# Patient Record
Sex: MALE | Race: WHITE | NOT HISPANIC OR LATINO | Employment: OTHER | URBAN - METROPOLITAN AREA
[De-identification: names, ages, dates, MRNs, and addresses within clinical notes are randomized per-mention and may not be internally consistent; named-entity substitution may affect disease eponyms.]

---

## 2017-12-29 ENCOUNTER — OFFICE VISIT (OUTPATIENT)
Dept: INTERNAL MEDICINE | Age: 22
End: 2017-12-29

## 2017-12-29 VITALS
BODY MASS INDEX: 16.04 KG/M2 | SYSTOLIC BLOOD PRESSURE: 110 MMHG | TEMPERATURE: 98.7 F | HEIGHT: 75 IN | DIASTOLIC BLOOD PRESSURE: 60 MMHG | RESPIRATION RATE: 12 BRPM | WEIGHT: 129 LBS | OXYGEN SATURATION: 98 % | HEART RATE: 86 BPM

## 2017-12-29 DIAGNOSIS — M41.9 SCOLIOSIS OF LUMBAR SPINE, UNSPECIFIED SCOLIOSIS TYPE: ICD-10-CM

## 2017-12-29 DIAGNOSIS — J30.1 CHRONIC SEASONAL ALLERGIC RHINITIS DUE TO POLLEN: Primary | ICD-10-CM

## 2017-12-29 PROCEDURE — 99203 OFFICE O/P NEW LOW 30 MIN: CPT | Performed by: INTERNAL MEDICINE

## 2017-12-29 RX ORDER — DIPHENHYDRAMINE HCL 12.5MG/5ML
LIQUID (ML) ORAL
COMMUNITY
End: 2022-11-07

## 2017-12-29 RX ORDER — CLOBETASOL PROPIONATE 0.5 MG/G
OINTMENT TOPICAL
COMMUNITY
Start: 2017-12-11 | End: 2022-11-07

## 2017-12-29 NOTE — PROGRESS NOTES
Kenney Balderrama is a 22 y.o. male who presents with   Chief Complaint   Patient presents with   • Get established     22-year-old male presents to [facility] for adult medical care.  He has been seeing his pediatrician but needs an internal medicine doctor to continue his care into adulthood.  He has no complaints.  He does have a history of scoliosis of his lumbar area and seasonal allergies but other than that his history is negative and he is not on any regular medication by prescription.   .    History of Present Illness -history as above.    The following portions of the patient's history were reviewed and updated as appropriate: allergies, current medications, past medical history and problem list.    Review of Systems   Constitutional: Negative.    HENT: Negative.    Eyes: Negative.    Respiratory: Negative.    Cardiovascular: Negative.    Genitourinary: Negative.    Musculoskeletal: Negative.         Past history of lumbar scoliosis.  Currently asymptomatic for back pain.   Skin: Negative.    Neurological: Negative.    Psychiatric/Behavioral: Negative.        Objective   Physical Exam   Constitutional: He is oriented to person, place, and time. He appears well-developed and well-nourished. No distress.   HENT:   Head: Normocephalic and atraumatic.   Eyes: Conjunctivae and EOM are normal. Pupils are equal, round, and reactive to light.   Neck: Normal range of motion. Neck supple. No thyromegaly present.   Neck exam negative.  Carotid auscultation normal-no bruits heard.   Cardiovascular: Normal rate, regular rhythm, normal heart sounds and intact distal pulses.  Exam reveals no gallop and no friction rub.    No murmur heard.  Pulmonary/Chest: Effort normal and breath sounds normal. No respiratory distress. He has no wheezes. He has no rales. He exhibits no tenderness.   Neurological: He is alert and oriented to person, place, and time.   Psychiatric: He has a normal mood and affect. His behavior is normal.  "Judgment and thought content normal.   Nursing note and vitals reviewed.      Assessment/Plan   Kenney was seen today for get established.    Diagnoses and all orders for this visit:    Chronic seasonal allergic rhinitis due to pollen    Scoliosis of lumbar spine, unspecified scoliosis type      Plan: This is a healthy 22-year-old male who has no complaints on today's visit but presents to get established for future internal medical care.  He has transferred from his pediatrician's office.  He has no allergies to medication but does have a history of \"seasonal allergies\".  He is not on any oral prescription medication.  He has had no surgery and no medical problems in the past to amount to anything he says.    At this point he does not need any medical attention for any problems.  I suggested we see him in one year for a general follow-up visit.  He was advised to come in fasting then and we will get some labs and check a few things out like CMP, lipid panel and thyroid profile with her blood count.  Review of his medical records from the past showed a platelet count of 143,000 in August 2015 from [facility].         "

## 2018-01-11 ENCOUNTER — OFFICE VISIT (OUTPATIENT)
Dept: INTERNAL MEDICINE | Age: 23
End: 2018-01-11

## 2018-01-11 VITALS
HEIGHT: 75 IN | TEMPERATURE: 98.2 F | DIASTOLIC BLOOD PRESSURE: 76 MMHG | OXYGEN SATURATION: 98 % | HEART RATE: 99 BPM | SYSTOLIC BLOOD PRESSURE: 112 MMHG | WEIGHT: 129 LBS | BODY MASS INDEX: 16.04 KG/M2

## 2018-01-11 DIAGNOSIS — J06.9 VIRAL URI WITH COUGH: Primary | ICD-10-CM

## 2018-01-11 LAB
EXPIRATION DATE: NORMAL
FLUAV AG NPH QL: NEGATIVE
FLUBV AG NPH QL: NEGATIVE
INTERNAL CONTROL: NORMAL
Lab: NORMAL

## 2018-01-11 PROCEDURE — 87804 INFLUENZA ASSAY W/OPTIC: CPT | Performed by: NURSE PRACTITIONER

## 2018-01-11 PROCEDURE — 99213 OFFICE O/P EST LOW 20 MIN: CPT | Performed by: NURSE PRACTITIONER

## 2018-01-11 NOTE — PROGRESS NOTES
Subjective   Kenney Balderrama is a 22 y.o. male.     URI    This is a new problem. The current episode started yesterday. Maximum temperature: 99.8. Associated symptoms include coughing, headaches, rhinorrhea, a sore throat and vomiting (x 1 episode). Pertinent negatives include no chest pain, diarrhea or nausea. He has tried acetaminophen and decongestant for the symptoms. The treatment provided mild relief.    He has had flu vaccination this season. Has had sick contacts at school.     The following portions of the patient's history were reviewed and updated as appropriate: allergies, current medications, past family history, past medical history, past social history, past surgical history and problem list.    Review of Systems   Constitutional: Positive for chills, fatigue and fever.   HENT: Positive for rhinorrhea and sore throat.    Respiratory: Positive for cough.    Cardiovascular: Negative for chest pain.   Gastrointestinal: Positive for vomiting (x 1 episode). Negative for diarrhea and nausea.   Neurological: Positive for headaches.       Objective   Physical Exam   Constitutional: He is oriented to person, place, and time. Vital signs are normal. He appears well-developed and well-nourished. He is cooperative. He does not appear ill. No distress.   HENT:   Head: Normocephalic and atraumatic.   Right Ear: Hearing, tympanic membrane, external ear and ear canal normal. No drainage or swelling. Tympanic membrane is not injected and not erythematous. No middle ear effusion.   Left Ear: Hearing, tympanic membrane, external ear and ear canal normal. No drainage or swelling. Tympanic membrane is not injected and not erythematous.  No middle ear effusion.   Nose: Nose normal.   Mouth/Throat: Uvula is midline, oropharynx is clear and moist and mucous membranes are normal. No tonsillar exudate.   Cardiovascular: Normal rate, regular rhythm and normal heart sounds.    No murmur heard.  Pulmonary/Chest: Effort normal and  breath sounds normal. He has no decreased breath sounds. He has no wheezes. He has no rhonchi. He has no rales.   Lymphadenopathy:     He has no cervical adenopathy.        Right cervical: No superficial cervical, no deep cervical and no posterior cervical adenopathy present.       Left cervical: No superficial cervical, no deep cervical and no posterior cervical adenopathy present.   Neurological: He is alert and oriented to person, place, and time.   Skin: Skin is warm, dry and intact.   Psychiatric: He has a normal mood and affect. His speech is normal and behavior is normal. Judgment and thought content normal. Cognition and memory are normal.   Nursing note and vitals reviewed.      Assessment/Plan   Problems Addressed this Visit     None      Visit Diagnoses     Viral URI with cough    -  Primary    Relevant Orders    POCT Influenza A/B        1. Viral URI with cough  Discussed appropriate conservative and symptomatic treatment with patient, including use of NSAIDs or Tylenol for fever or pain, increase by mouth fluids, and rest. Discussed when to follow up for recheck, including worsening symptoms such as fever, purulent nasal drainage, worsening cough, productive cough, etc.     - POCT Influenza A/B

## 2018-01-11 NOTE — PATIENT INSTRUCTIONS
"Upper Respiratory Infection, Adult  Most upper respiratory infections (URIs) are a viral infection of the air passages leading to the lungs. A URI affects the nose, throat, and upper air passages. The most common type of URI is nasopharyngitis and is typically referred to as \"the common cold.\"  URIs run their course and usually go away on their own. Most of the time, a URI does not require medical attention, but sometimes a bacterial infection in the upper airways can follow a viral infection. This is called a secondary infection. Sinus and middle ear infections are common types of secondary upper respiratory infections.  Bacterial pneumonia can also complicate a URI. A URI can worsen asthma and chronic obstructive pulmonary disease (COPD). Sometimes, these complications can require emergency medical care and may be life threatening.   CAUSES  Almost all URIs are caused by viruses. A virus is a type of germ and can spread from one person to another.   RISKS FACTORS  You may be at risk for a URI if:   · You smoke.    · You have chronic heart or lung disease.  · You have a weakened defense (immune) system.    · You are very young or very old.    · You have nasal allergies or asthma.  · You work in crowded or poorly ventilated areas.  · You work in health care facilities or schools.  SIGNS AND SYMPTOMS   Symptoms typically develop 2-3 days after you come in contact with a cold virus. Most viral URIs last 7-10 days. However, viral URIs from the influenza virus (flu virus) can last 14-18 days and are typically more severe. Symptoms may include:   · Runny or stuffy (congested) nose.    · Sneezing.    · Cough.    · Sore throat.    · Headache.    · Fatigue.    · Fever.    · Loss of appetite.    · Pain in your forehead, behind your eyes, and over your cheekbones (sinus pain).  · Muscle aches.    DIAGNOSIS   Your health care provider may diagnose a URI by:  · Physical exam.  · Tests to check that your symptoms are not due to " another condition such as:  ¨ Strep throat.  ¨ Sinusitis.  ¨ Pneumonia.  ¨ Asthma.  TREATMENT   A URI goes away on its own with time. It cannot be cured with medicines, but medicines may be prescribed or recommended to relieve symptoms. Medicines may help:  · Reduce your fever.  · Reduce your cough.  · Relieve nasal congestion.  HOME CARE INSTRUCTIONS   · Take medicines only as directed by your health care provider.    · Gargle warm saltwater or take cough drops to comfort your throat as directed by your health care provider.  · Use a warm mist humidifier or inhale steam from a shower to increase air moisture. This may make it easier to breathe.  · Drink enough fluid to keep your urine clear or pale yellow.    · Eat soups and other clear broths and maintain good nutrition.    · Rest as needed.    · Return to work when your temperature has returned to normal or as your health care provider advises. You may need to stay home longer to avoid infecting others. You can also use a face mask and careful hand washing to prevent spread of the virus.  · Increase the usage of your inhaler if you have asthma.    · Do not use any tobacco products, including cigarettes, chewing tobacco, or electronic cigarettes. If you need help quitting, ask your health care provider.  PREVENTION   The best way to protect yourself from getting a cold is to practice good hygiene.   · Avoid oral or hand contact with people with cold symptoms.    · Wash your hands often if contact occurs.    There is no clear evidence that vitamin C, vitamin E, echinacea, or exercise reduces the chance of developing a cold. However, it is always recommended to get plenty of rest, exercise, and practice good nutrition.   SEEK MEDICAL CARE IF:   · You are getting worse rather than better.    · Your symptoms are not controlled by medicine.    · You have chills.  · You have worsening shortness of breath.  · You have brown or red mucus.  · You have yellow or brown nasal  discharge.  · You have pain in your face, especially when you bend forward.  · You have a fever.  · You have swollen neck glands.  · You have pain while swallowing.  · You have white areas in the back of your throat.  SEEK IMMEDIATE MEDICAL CARE IF:   · You have severe or persistent:    Headache.    Ear pain.    Sinus pain.    Chest pain.  · You have chronic lung disease and any of the following:    Wheezing.    Prolonged cough.    Coughing up blood.    A change in your usual mucus.  · You have a stiff neck.  · You have changes in your:    Vision.    Hearing.    Thinking.    Mood.  MAKE SURE YOU:   · Understand these instructions.  · Will watch your condition.  · Will get help right away if you are not doing well or get worse.     This information is not intended to replace advice given to you by your health care provider. Make sure you discuss any questions you have with your health care provider.     Document Released: 06/13/2002 Document Revised: 05/03/2016 Document Reviewed: 03/25/2015  [email or web address] Interactive Patient Education ©2017 [employer]

## 2018-12-28 ENCOUNTER — OFFICE VISIT (OUTPATIENT)
Dept: INTERNAL MEDICINE | Age: 23
End: 2018-12-28

## 2018-12-28 VITALS
SYSTOLIC BLOOD PRESSURE: 110 MMHG | HEIGHT: 75 IN | OXYGEN SATURATION: 97 % | WEIGHT: 134 LBS | TEMPERATURE: 97.2 F | HEART RATE: 88 BPM | RESPIRATION RATE: 13 BRPM | DIASTOLIC BLOOD PRESSURE: 60 MMHG | BODY MASS INDEX: 16.66 KG/M2

## 2018-12-28 DIAGNOSIS — Z00.00 ANNUAL PHYSICAL EXAM: Primary | ICD-10-CM

## 2018-12-28 LAB
ALBUMIN SERPL-MCNC: 4.7 G/DL (ref 3.5–5.2)
ALBUMIN/GLOB SERPL: 2 G/DL
ALP SERPL-CCNC: 90 U/L (ref 39–117)
ALT SERPL-CCNC: 16 U/L (ref 1–41)
APPEARANCE UR: CLEAR
AST SERPL-CCNC: 16 U/L (ref 1–40)
BASOPHILS # BLD AUTO: 0.06 10*3/MM3 (ref 0–0.2)
BASOPHILS NFR BLD AUTO: 0.9 % (ref 0–1.5)
BILIRUB SERPL-MCNC: 0.5 MG/DL (ref 0.1–1.2)
BILIRUB UR QL STRIP: NEGATIVE
BUN SERPL-MCNC: 10 MG/DL (ref 6–20)
BUN/CREAT SERPL: 11.9 (ref 7–25)
CALCIUM SERPL-MCNC: 9.7 MG/DL (ref 8.6–10.5)
CHLORIDE SERPL-SCNC: 105 MMOL/L (ref 98–107)
CHOLEST SERPL-MCNC: 114 MG/DL (ref 0–200)
CO2 SERPL-SCNC: 28.9 MMOL/L (ref 22–29)
COLOR UR: YELLOW
CREAT SERPL-MCNC: 0.84 MG/DL (ref 0.76–1.27)
EOSINOPHIL # BLD AUTO: 0.84 10*3/MM3 (ref 0–0.7)
EOSINOPHIL NFR BLD AUTO: 12 % (ref 0.3–6.2)
ERYTHROCYTE [DISTWIDTH] IN BLOOD BY AUTOMATED COUNT: 13 % (ref 11.5–14.5)
GLOBULIN SER CALC-MCNC: 2.4 GM/DL
GLUCOSE SERPL-MCNC: 92 MG/DL (ref 65–99)
GLUCOSE UR QL: NEGATIVE
HCT VFR BLD AUTO: 44.5 % (ref 40.4–52.2)
HDLC SERPL-MCNC: 65 MG/DL (ref 40–60)
HGB BLD-MCNC: 15.5 G/DL (ref 13.7–17.6)
HGB UR QL STRIP: NEGATIVE
IMM GRANULOCYTES # BLD: 0.01 10*3/MM3 (ref 0–0.03)
IMM GRANULOCYTES NFR BLD: 0.1 % (ref 0–0.5)
KETONES UR QL STRIP: NEGATIVE
LDLC SERPL CALC-MCNC: 41 MG/DL (ref 0–100)
LEUKOCYTE ESTERASE UR QL STRIP: NEGATIVE
LYMPHOCYTES # BLD AUTO: 2.29 10*3/MM3 (ref 0.9–4.8)
LYMPHOCYTES NFR BLD AUTO: 32.8 % (ref 19.6–45.3)
MCH RBC QN AUTO: 31.3 PG (ref 27–32.7)
MCHC RBC AUTO-ENTMCNC: 34.8 G/DL (ref 32.6–36.4)
MCV RBC AUTO: 89.9 FL (ref 79.8–96.2)
MONOCYTES # BLD AUTO: 0.41 10*3/MM3 (ref 0.2–1.2)
MONOCYTES NFR BLD AUTO: 5.9 % (ref 5–12)
NEUTROPHILS # BLD AUTO: 3.38 10*3/MM3 (ref 1.9–8.1)
NEUTROPHILS NFR BLD AUTO: 48.4 % (ref 42.7–76)
NITRITE UR QL STRIP: NEGATIVE
PH UR STRIP: 6 [PH] (ref 5–8)
PLATELET # BLD AUTO: 189 10*3/MM3 (ref 140–500)
POTASSIUM SERPL-SCNC: 4.3 MMOL/L (ref 3.5–5.2)
PROT SERPL-MCNC: 7.1 G/DL (ref 6–8.5)
PROT UR QL STRIP: NEGATIVE
RBC # BLD AUTO: 4.95 10*6/MM3 (ref 4.6–6)
SODIUM SERPL-SCNC: 145 MMOL/L (ref 136–145)
SP GR UR: 1.02 (ref 1–1.03)
T4 FREE SERPL-MCNC: 1.2 NG/DL (ref 0.93–1.7)
TRIGL SERPL-MCNC: 41 MG/DL (ref 0–150)
TSH SERPL DL<=0.005 MIU/L-ACNC: 1.55 MIU/ML (ref 0.27–4.2)
UROBILINOGEN UR STRIP-MCNC: NORMAL MG/DL
VLDLC SERPL CALC-MCNC: 8.2 MG/DL (ref 5–40)
WBC # BLD AUTO: 6.98 10*3/MM3 (ref 4.5–10.7)

## 2018-12-28 PROCEDURE — 99395 PREV VISIT EST AGE 18-39: CPT | Performed by: INTERNAL MEDICINE

## 2018-12-28 NOTE — PROGRESS NOTES
"  Kenney Balderrama is a 23 y.o. male who presents with   Chief Complaint   Patient presents with   • Annual Exam     No complaints or problems.  History of seasonal allergies every spring.  Patient requesting labs since he has not had any in several years.   .    23-year-old male presents for a \"checkup and lab update\" however after talking to him although it was not scheduled he really had in mind a physical exam with labs being done.  He says as far as he knows he is in good health but he has not had any lab tests done in many years.  He does have a history of seasonal allergies and affect him generally every spring.  He is not on any current medications.  He does not have any allergies to medications that he knows of and currently has no medical symptoms or problems he says.         The following portions of the patient's history were reviewed and updated as appropriate: allergies, current medications, past family history, past medical history, past social history, past surgical history and problem list.    Review of Systems   Constitutional: Negative.    HENT: Negative.    Eyes: Negative.    Respiratory: Negative.    Cardiovascular: Negative.    Genitourinary: Negative.    Musculoskeletal: Negative.    Skin: Negative.    Neurological: Negative.    Psychiatric/Behavioral: Negative.        Objective   Physical Exam   Constitutional: He is oriented to person, place, and time. He appears well-developed and well-nourished. No distress.   HENT:   Head: Normocephalic and atraumatic.   Right Ear: External ear normal.   Left Ear: External ear normal.   Nose: Nose normal.   Mouth/Throat: Oropharynx is clear and moist. No oropharyngeal exudate.   Eyes: Conjunctivae and EOM are normal. Pupils are equal, round, and reactive to light. Right eye exhibits no discharge. Left eye exhibits no discharge. No scleral icterus.   Neck: Normal range of motion. Neck supple. No JVD present. No tracheal deviation present. No thyromegaly " present.   Neck exam negative.  Carotid auscultation normal-no bruits heard.   Cardiovascular: Normal rate, regular rhythm, normal heart sounds and intact distal pulses. Exam reveals no gallop and no friction rub.   No murmur heard.  Pulmonary/Chest: Effort normal and breath sounds normal. No respiratory distress. He has no wheezes. He has no rales. He exhibits no tenderness.   Abdominal: Soft. Bowel sounds are normal. He exhibits no distension and no mass. There is no tenderness. No hernia.   Genitourinary:   Genitourinary Comments: Deferred.  Asymptomatic.   Musculoskeletal: Normal range of motion. He exhibits no edema, tenderness or deformity.   Lymphadenopathy:     He has no cervical adenopathy.   Neurological: He is alert and oriented to person, place, and time. He has normal reflexes. He displays normal reflexes. No cranial nerve deficit. He exhibits normal muscle tone. Coordination normal.   Skin: Skin is warm and dry. No rash noted. He is not diaphoretic. No erythema. No pallor.   Psychiatric: He has a normal mood and affect. His behavior is normal. Judgment and thought content normal.   Nursing note and vitals reviewed.      Assessment/Plan   Kenney was seen today for annual exam.    Diagnoses and all orders for this visit:    Annual physical exam  -     CBC & Differential  -     Comprehensive Metabolic Panel  -     Lipid Panel  -     TSH+Free T4  -     Urinalysis With Microscopic If Indicated (No Culture) - Urine, Clean Catch      Plan: Labs as above for the issues as outlined.  Assuming today's labs are all acceptable, we will see him in one year for a follow-up physical with lab updates.

## 2018-12-30 NOTE — PROGRESS NOTES
All labs are within normal / acceptable ranges. A followup physical and lab testing in one year is advised.

## 2019-10-08 ENCOUNTER — FLU SHOT (OUTPATIENT)
Dept: INTERNAL MEDICINE | Age: 24
End: 2019-10-08

## 2019-10-08 DIAGNOSIS — Z23 FLU VACCINE NEED: ICD-10-CM

## 2019-10-09 PROCEDURE — 90674 CCIIV4 VAC NO PRSV 0.5 ML IM: CPT | Performed by: INTERNAL MEDICINE

## 2019-10-09 PROCEDURE — 90471 IMMUNIZATION ADMIN: CPT | Performed by: INTERNAL MEDICINE

## 2020-11-23 ENCOUNTER — TELEPHONE (OUTPATIENT)
Dept: INTERNAL MEDICINE | Age: 25
End: 2020-11-23

## 2020-11-23 NOTE — TELEPHONE ENCOUNTER
If the patient has not contacted the individual who tested positive for Covid then all he needs to do is watch himself for development of fever, cough, congestion, drainage, loss of sense of taste/smell.  If any of that develops he needs to be tested.  Otherwise, proceed with life as we know it now with masking, social distancing, frequent handwashing etc.

## 2020-11-23 NOTE — TELEPHONE ENCOUNTER
Caller: Jessica Balderrama    Relationship to patient: Mother    Best call back number: [phone]     Concerns or Questions if Applicable: Covid questions as to testing or quarantining.    Travel screen questions: Patient's mother states patient had a co worker that tested positive for covid. She states patient did not come into contact with that individual.  He is wanting to know what Dr. Landers suggests.    Please advise.

## 2022-11-07 ENCOUNTER — OFFICE VISIT (OUTPATIENT)
Dept: INTERNAL MEDICINE | Age: 27
End: 2022-11-07

## 2022-11-07 VITALS
HEART RATE: 84 BPM | OXYGEN SATURATION: 96 % | WEIGHT: 140 LBS | BODY MASS INDEX: 17.41 KG/M2 | SYSTOLIC BLOOD PRESSURE: 118 MMHG | DIASTOLIC BLOOD PRESSURE: 76 MMHG | TEMPERATURE: 97.6 F | HEIGHT: 75 IN

## 2022-11-07 DIAGNOSIS — Z00.00 ANNUAL PHYSICAL EXAM: Primary | ICD-10-CM

## 2022-11-07 DIAGNOSIS — Z11.59 NEED FOR HEPATITIS C SCREENING TEST: ICD-10-CM

## 2022-11-07 DIAGNOSIS — Z23 FLU VACCINE NEED: ICD-10-CM

## 2022-11-07 DIAGNOSIS — Z76.89 ENCOUNTER TO ESTABLISH CARE: ICD-10-CM

## 2022-11-07 DIAGNOSIS — Z23 ENCOUNTER FOR IMMUNIZATION: ICD-10-CM

## 2022-11-07 PROCEDURE — 90471 IMMUNIZATION ADMIN: CPT

## 2022-11-07 PROCEDURE — 90715 TDAP VACCINE 7 YRS/> IM: CPT

## 2022-11-07 PROCEDURE — 90472 IMMUNIZATION ADMIN EACH ADD: CPT

## 2022-11-07 PROCEDURE — 99395 PREV VISIT EST AGE 18-39: CPT

## 2022-11-07 PROCEDURE — 90686 IIV4 VACC NO PRSV 0.5 ML IM: CPT

## 2022-11-07 NOTE — PROGRESS NOTES
"    I N T E R N A L  M E D I C I N E  Adenike Bedoyajose martin, APRN      ENCOUNTER DATE:  11/07/2022    Kenney Tacos / 27 y.o. / male    CHIEF COMPLAINT     Annual Exam and Establish Care    Here to establish care and for annual exam.  He works with a [employer] company.    He reports he recently had vision exam done and was told by optometrist that his R eye showed possible signs of hypertension.  He denies any headaches, chest pain, dyspnea, lower extremity edema.  Does not currently check his BP at home.  BP within normal range at today's appointment.    VITALS     Visit Vitals  /76   Pulse 84   Temp 97.6 °F (36.4 °C)   Ht 190.5 cm (75\")   Wt 63.5 kg (140 lb)   SpO2 96%   BMI 17.50 kg/m²       BP Readings from Last 3 Encounters:   11/07/22 118/76   12/28/18 110/60   01/11/18 112/76     Wt Readings from Last 3 Encounters:   11/07/22 63.5 kg (140 lb)   12/28/18 60.8 kg (134 lb)   01/11/18 58.5 kg (129 lb)      Body mass index is 17.5 kg/m².      MEDICATIONS     Current Outpatient Medications on File Prior to Visit   Medication Sig Dispense Refill   • [DISCONTINUED] clobetasol (TEMOVATE) 0.05 % ointment Apply  topically.     • [DISCONTINUED] diphenhydrAMINE (BENADRYL) 12.5 MG/5ML elixir Take  by mouth.       No current facility-administered medications on file prior to visit.         HISTORY OF PRESENT ILLNESS      Kenney presents for annual health maintenance visit.    · General health: excellent  · Lifestyle:  · Attempting to lose weight?: No   · Diet: eats a well balanced, healthy diet  · Exercise: very active at work/home  · Tobacco: Never used   · Alcohol: does not drink  · Work: Full-time  · Reproductive health:  · Sexually active?: No   · Concern for STD?: No   · Sexual problems?: No problems   · Sees Urologist?: No   · Depression Screening:      PHQ-2/PHQ-9 Depression Screening 11/7/2022   Retired Total Score -   Little Interest or Pleasure in Doing Things 0-->not at all   Feeling Down, Depressed or Hopeless 0-->not at " all   PHQ-9: Brief Depression Severity Measure Score 0         PHQ-2: 0 (Not depressed)     PHQ-9: 0 (Negative screening for depression)    Patient Care Team:  Adenike Pollack APRN as PCP - General (Family Medicine)  ______________________________________________________________________    ALLERGIES  Allergies   Allergen Reactions   • Poison Ivy Extract         PFSH:     The following portions of the patient's history were reviewed and updated as appropriate: Allergies / Current Medications / Past Medical History / Surgical History / Social History / Family History    PROBLEM LIST   Patient Active Problem List   Diagnosis   • Low back pain   • Marfanoid habitus   • Scoliosis   • Chronic seasonal allergic rhinitis due to pollen       PAST MEDICAL HISTORY  Past Medical History:   Diagnosis Date   • Allergic        SURGICAL HISTORY  Past Surgical History:   Procedure Laterality Date   • HERNIA REPAIR         SOCIAL HISTORY  Social History     Socioeconomic History   • Marital status: Single   Tobacco Use   • Smoking status: Never   • Smokeless tobacco: Never   Substance and Sexual Activity   • Alcohol use: No   • Drug use: No   • Sexual activity: Yes     Partners: Female       FAMILY HISTORY  Family History   Problem Relation Age of Onset   • No Known Problems Mother    • Diabetes type I Father    • No Known Problems Maternal Grandmother    • No Known Problems Paternal Grandmother    • COPD Paternal Grandfather    • Hypertension Paternal Grandfather    • Cancer Maternal Aunt         Pancreatic       IMMUNIZATION HISTORY  Immunization History   Administered Date(s) Administered   • DTaP 1995, 1997, 02/21/1998, 02/09/1999, 03/13/2002   • FluLaval/Fluzone >6mos 10/30/2015, 10/03/2020, 11/07/2022   • FluMist 2-49yrs 10/26/2017   • Fluzone Quad >6mos (Multi-dose) 10/02/2012, 12/08/2016, 12/08/2016   • HPV Quadrivalent 09/07/2013, 01/11/2014   • Hep B, Adolescent or Pediatric 1997, 1997, 05/12/1998   •  Hib (PRP-OMP) 1997, 1997, 02/21/1998, 08/11/1998   • IPV 1995, 1997, 08/11/1998, 03/13/2002   • Influenza TIV (IM) 10/15/2008   • MMR 08/11/1998, 03/13/2002   • Meningococcal Conjugate 11/11/2008, 08/10/2015   • Tdap 11/11/2008, 11/07/2022   • flucelvax quad pfs =>4 YRS 10/09/2019         REVIEW OF SYSTEMS     Review of Systems   Constitutional: Negative for chills, fever and unexpected weight change.   Respiratory: Negative for cough, chest tightness and shortness of breath.    Cardiovascular: Negative for chest pain, palpitations and leg swelling.   Neurological: Negative for dizziness, weakness, light-headedness and headaches.   Psychiatric/Behavioral: Negative for sleep disturbance. The patient is not nervous/anxious.        PHYSICAL EXAMINATION     Physical Exam  Vitals reviewed.   Constitutional:       General: He is not in acute distress.     Appearance: Normal appearance. He is not ill-appearing, toxic-appearing or diaphoretic.   HENT:      Head: Normocephalic and atraumatic.      Right Ear: Tympanic membrane, ear canal and external ear normal. There is no impacted cerumen.      Left Ear: Tympanic membrane, ear canal and external ear normal. There is no impacted cerumen.      Nose: Nose normal. No congestion or rhinorrhea.      Mouth/Throat:      Mouth: Mucous membranes are moist.      Pharynx: Oropharynx is clear. No oropharyngeal exudate or posterior oropharyngeal erythema.   Eyes:      Extraocular Movements: Extraocular movements intact.      Conjunctiva/sclera: Conjunctivae normal.      Pupils: Pupils are equal, round, and reactive to light.   Cardiovascular:      Rate and Rhythm: Normal rate and regular rhythm.      Heart sounds: Normal heart sounds.   Pulmonary:      Effort: Pulmonary effort is normal. No respiratory distress.      Breath sounds: Normal breath sounds.   Abdominal:      General: Bowel sounds are normal.      Palpations: Abdomen is soft.      Tenderness: There is  no abdominal tenderness.   Musculoskeletal:         General: Normal range of motion.      Cervical back: Normal range of motion and neck supple.      Right lower leg: No edema.      Left lower leg: No edema.   Lymphadenopathy:      Cervical: No cervical adenopathy.   Skin:     General: Skin is warm and dry.   Neurological:      General: No focal deficit present.      Mental Status: He is alert and oriented to person, place, and time. Mental status is at baseline.   Psychiatric:         Mood and Affect: Mood normal.         Behavior: Behavior normal.         Thought Content: Thought content normal.         Judgment: Judgment normal.         REVIEWED DATA      Labs:    Lab Results   Component Value Date     12/28/2018    K 4.3 12/28/2018    CALCIUM 9.7 12/28/2018    AST 16 12/28/2018    ALT 16 12/28/2018    BUN 10 12/28/2018    CREATININE 0.84 12/28/2018    EGFRIFNONA 115 12/28/2018    EGFRIFAFRI 140 12/28/2018       Lab Results   Component Value Date    GLUCOSE 92 12/28/2018    TSH 1.550 12/28/2018    FREET4 1.20 12/28/2018       No results found for: PSA    [unfilled]    Lab Results   Component Value Date    LDL 41 12/28/2018    HDL 65 (H) 12/28/2018    TRIG 41 12/28/2018       No components found for: [id number]    Lab Results   Component Value Date    WBC 6.98 12/28/2018    HGB 15.5 12/28/2018    MCV 89.9 12/28/2018     12/28/2018       Lab Results   Component Value Date    PROTEIN Negative 12/28/2018    GLUCOSEU Negative 12/28/2018    BLOODU Negative 12/28/2018    NITRITEU Negative 12/28/2018    LEUKOCYTESUR Negative 12/28/2018        No results found for: HEPCVIRUSABY    Imaging:           Medical Tests:           ASSESSMENT & PLAN     ANNUAL WELLNESS EXAM / PHYSICAL     Diagnoses and all orders for this visit:    1. Annual physical exam (Primary)  -     CBC & Differential  -     Comprehensive Metabolic Panel  -     Hemoglobin A1c  -     Lipid Panel With / Chol / HDL Ratio  -     T4, Free  -     TSH  -      Urinalysis With Microscopic If Indicated (No Culture) - Urine, Clean Catch  -     FluLaval/Fluarix/Fluzone >6 Months  -     Hepatitis C Antibody    2. Encounter to establish care    3. Need for hepatitis C screening test  -     Hepatitis C Antibody    4. Flu vaccine need  -     FluLaval/Fluarix/Fluzone >6 Months    5. Encounter for immunization  -     Tdap Vaccine Greater Than or Equal To 6yo IM         Summary/Discussion:     · Pt agreeable to purchase BP cuff and check values over 1 week.  He will provide update via Rii[employer] for review.    Next Appointment with me: Visit date not found    Return in about 1 year (around 11/7/2023) for Annual physical.      HEALTHCARE MAINTENANCE ISSUES       Cancer Screening:  · Colon: Initial/Next screening at age: 45  · Repeat colon cancer screening: N/A at this time  · Prostate: Start screening at 45 then annually  · Testicular: Recommended monthly self exam  · Skin: Monthly self skin examination, annual exam by health professional  · Lung: Does not meet criteria for lung cancer screening.   · Other:    Screening Labs & Tests:  · Lab results reviewed & discussed with with patient or orders placed today.  · EKG:  · CV Screening: Lipid panel  · DEXA (75+ or risk factors):   · HEP C (If born 2323-9912 or risk factors): Ordered  · Other:     Immunization/Vaccinations (to be given today unless deferred by patient)  · Influenza: Patient had the flu shot this season  · Hepatitis A: Verify immunization records  · Hepatitis B: Up to date  · Tetanus/Pertussis: Administer today  · Pneumovax/PCV: Not needed at this time  · Shingles: Not needed at this time  · COVID: Advised to take the vaccine   Lifestyle Counseling:  · Lifestyle Modifications: Reduce exposure to stress if possible  · Safety Issues: Always wear seatbelt, Avoid texting while driving   · Use sunscreen, regular skin examination  · Recommended annual dental/vision examination.  · Emotional/Stress/Sleep: Reviewed and   given when appropriate      Health Maintenance   Topic Date Due   • COVID-19 Vaccine (1) Never done   • HEPATITIS C SCREENING  Never done   • ANNUAL PHYSICAL  11/08/2023   • TDAP/TD VACCINES (3 - Td or Tdap) 11/07/2032   • INFLUENZA VACCINE  Completed   • Pneumococcal Vaccine 0-64  Aged Out

## 2022-11-08 LAB
ALBUMIN SERPL-MCNC: 5.4 G/DL (ref 3.5–5.2)
ALBUMIN/GLOB SERPL: 3.2 G/DL
ALP SERPL-CCNC: 75 U/L (ref 39–117)
ALT SERPL-CCNC: 24 U/L (ref 1–41)
APPEARANCE UR: CLEAR
AST SERPL-CCNC: 23 U/L (ref 1–40)
BASOPHILS # BLD AUTO: 0.06 10*3/MM3 (ref 0–0.2)
BASOPHILS NFR BLD AUTO: 0.9 % (ref 0–1.5)
BILIRUB SERPL-MCNC: 0.6 MG/DL (ref 0–1.2)
BILIRUB UR QL STRIP: NEGATIVE
BUN SERPL-MCNC: 7 MG/DL (ref 6–20)
BUN/CREAT SERPL: 9.3 (ref 7–25)
CALCIUM SERPL-MCNC: 9.7 MG/DL (ref 8.6–10.5)
CHLORIDE SERPL-SCNC: 102 MMOL/L (ref 98–107)
CHOLEST SERPL-MCNC: 120 MG/DL (ref 0–200)
CHOLEST/HDLC SERPL: 1.54 {RATIO}
CO2 SERPL-SCNC: 30.3 MMOL/L (ref 22–29)
COLOR UR: YELLOW
CREAT SERPL-MCNC: 0.75 MG/DL (ref 0.76–1.27)
EGFRCR SERPLBLD CKD-EPI 2021: 128.4 ML/MIN/1.73
EOSINOPHIL # BLD AUTO: 0.24 10*3/MM3 (ref 0–0.4)
EOSINOPHIL NFR BLD AUTO: 3.6 % (ref 0.3–6.2)
ERYTHROCYTE [DISTWIDTH] IN BLOOD BY AUTOMATED COUNT: 13.1 % (ref 12.3–15.4)
GLOBULIN SER CALC-MCNC: 1.7 GM/DL
GLUCOSE SERPL-MCNC: 76 MG/DL (ref 65–99)
GLUCOSE UR QL STRIP: NEGATIVE
HBA1C MFR BLD: 5.2 % (ref 4.8–5.6)
HCT VFR BLD AUTO: 43.8 % (ref 37.5–51)
HCV AB S/CO SERPL IA: <0.1 S/CO RATIO (ref 0–0.9)
HDLC SERPL-MCNC: 78 MG/DL (ref 40–60)
HGB BLD-MCNC: 15.2 G/DL (ref 13–17.7)
HGB UR QL STRIP: NEGATIVE
IMM GRANULOCYTES # BLD AUTO: 0.01 10*3/MM3 (ref 0–0.05)
IMM GRANULOCYTES NFR BLD AUTO: 0.2 % (ref 0–0.5)
KETONES UR QL STRIP: NEGATIVE
LDLC SERPL CALC-MCNC: 32 MG/DL (ref 0–100)
LEUKOCYTE ESTERASE UR QL STRIP: NEGATIVE
LYMPHOCYTES # BLD AUTO: 1.98 10*3/MM3 (ref 0.7–3.1)
LYMPHOCYTES NFR BLD AUTO: 30.1 % (ref 19.6–45.3)
MCH RBC QN AUTO: 31.2 PG (ref 26.6–33)
MCHC RBC AUTO-ENTMCNC: 34.7 G/DL (ref 31.5–35.7)
MCV RBC AUTO: 89.9 FL (ref 79–97)
MONOCYTES # BLD AUTO: 0.44 10*3/MM3 (ref 0.1–0.9)
MONOCYTES NFR BLD AUTO: 6.7 % (ref 5–12)
NEUTROPHILS # BLD AUTO: 3.85 10*3/MM3 (ref 1.7–7)
NEUTROPHILS NFR BLD AUTO: 58.5 % (ref 42.7–76)
NITRITE UR QL STRIP: NEGATIVE
NRBC BLD AUTO-RTO: 0 /100 WBC (ref 0–0.2)
PH UR STRIP: 7.5 [PH] (ref 5–8)
PLATELET # BLD AUTO: 189 10*3/MM3 (ref 140–450)
POTASSIUM SERPL-SCNC: 4 MMOL/L (ref 3.5–5.2)
PROT SERPL-MCNC: 7.1 G/DL (ref 6–8.5)
PROT UR QL STRIP: NEGATIVE
RBC # BLD AUTO: 4.87 10*6/MM3 (ref 4.14–5.8)
SODIUM SERPL-SCNC: 143 MMOL/L (ref 136–145)
SP GR UR STRIP: 1.01 (ref 1–1.03)
T4 FREE SERPL-MCNC: 1.11 NG/DL (ref 0.93–1.7)
TRIGL SERPL-MCNC: 37 MG/DL (ref 0–150)
TSH SERPL DL<=0.005 MIU/L-ACNC: 1.95 UIU/ML (ref 0.27–4.2)
UROBILINOGEN UR STRIP-MCNC: NORMAL MG/DL
VLDLC SERPL CALC-MCNC: 10 MG/DL (ref 5–40)
WBC # BLD AUTO: 6.58 10*3/MM3 (ref 3.4–10.8)

## 2023-11-06 ENCOUNTER — TELEPHONE (OUTPATIENT)
Dept: INTERNAL MEDICINE | Age: 28
End: 2023-11-06
Payer: COMMERCIAL

## 2023-11-06 NOTE — TELEPHONE ENCOUNTER
Called patient to see if he wanted to combine his appointments on Wednesday and Friday. Left message.    Relay:  PCP is asking patient if he wants to keep the two separate appointments or if he wants to combine them both on Friday. Cannot address both appointments on Wednesday but if he wants to we can do both on Friday. If patient wants to combine, please cancel the appt on 11/8/23.

## 2023-11-08 ENCOUNTER — OFFICE VISIT (OUTPATIENT)
Dept: INTERNAL MEDICINE | Age: 28
End: 2023-11-08
Payer: COMMERCIAL

## 2023-11-08 VITALS
SYSTOLIC BLOOD PRESSURE: 116 MMHG | BODY MASS INDEX: 17.06 KG/M2 | HEART RATE: 96 BPM | WEIGHT: 137.2 LBS | OXYGEN SATURATION: 98 % | HEIGHT: 75 IN | TEMPERATURE: 98.7 F | DIASTOLIC BLOOD PRESSURE: 68 MMHG

## 2023-11-08 DIAGNOSIS — T17.308A CHOKING, INITIAL ENCOUNTER: ICD-10-CM

## 2023-11-08 DIAGNOSIS — Z23 NEED FOR INFLUENZA VACCINATION: ICD-10-CM

## 2023-11-08 DIAGNOSIS — R09.A2 GLOBUS SENSATION: Primary | ICD-10-CM

## 2023-11-08 NOTE — LETTER
[facility]  Vaccine Consent Form    Patient Name:  Kenney Balderrama  Patient :  1995     Vaccine(s) Ordered    Fluzone (or Fluarix & Flulaval for VFC) >6 Mos ([id number])        Screening Checklist  The following questions should be completed prior to vaccination. If you answer “yes” to any question, it does not necessarily mean you should not be vaccinated. It just means we may need to clarify or ask more questions. If a question is unclear, please ask your healthcare provider to explain it.    Yes No   Any fever or moderate to severe illness today (mild illness and/or antibiotic treatment are not contraindications)?     Do you have a history of a serious reaction to any previous vaccinations, such as anaphylaxis, encephalopathy within 7 days, Guillain-Westley syndrome within 6 weeks, seizure?     Have you received any live vaccine(s) in the past month (MMR, TAN)?     Do you have an anaphylactic allergy to latex (DTaP, DTaP-IPV, Hep A, Hep B, MenB, RV, Td, Tdap), baker’s yeast (Hep B, HPV), or gelatin (TAN, MMR)?     Do you have an anaphylactic allergy to neomycin (Rabies, TAN, MMR, IPV, Hep A), polymyxin B (IPV), or streptomycin (IPV)?      Any cancer, leukemia, AIDS, or other immune system disorder? (TAN, MMR, RV)     Do you have a parent, brother, or sister with an immune system problem (if immune competence of vaccine recipient clinically verified, can proceed)? (MMR, TAN)     Any recent steroid treatments for >2 weeks, chemotherapy, or radiation treatment? (TAN, MMR)     Have you received antibody-containing blood transfusions or IVIG in the past 11 months (recommended interval is dependent on product)? (MMR, TAN)     Have you taken antiviral drugs (acyclovir, famciclovir, valacyclovir) in the last 24 or 48 hours, respectively (TAN)?      Are you pregnant or planning to become pregnant within 1 month? (TAN, MMR, HPV, IPV, MenB; For hep B- refer to Engerix-B)     For infants, have you ever been told your  child has had intussusception or a medical emergency involving obstruction of the intestine (RV)? If not for an infant, can skip this question.         *Ordering Physician/APC should be consulted if “yes” is checked by the patient or guardian above.      I have received, read, and understand the Vaccine Information Statement (VIS) for each vaccine ordered above.  I have considered my health status as well as the health status of my close contacts.  I have taken the opportunity to discuss my vaccine questions with my health care provider.   I have requested that the ordered vaccine(s) be given to me.  I understand the benefits and risks of the vaccines.  I understand that I should remain in the clinic for 15 minutes after receiving the vaccine(s).  _________________________________________________________  Signature of Patient or Parent/Legal Guardian ____________________  Date

## 2023-11-08 NOTE — PROGRESS NOTES
"    I N T E R N A L  M E D I C I N E  DARINEL TERRAZAS, APRN      ENCOUNTER DATE:  11/08/2023    Keneny Balderrama / 28 y.o. / male      CHIEF COMPLAINT / REASON FOR OFFICE VISIT     Choking (Has had the heimlich maneuver twice over halloween, had an episode at age 5 where he swallowed a coin and had to have it removed from his throat )      ASSESSMENT & PLAN     Problem List Items Addressed This Visit    None  Visit Diagnoses       Globus sensation    -  Primary    Relevant Orders    Ambulatory Referral to ENT (Otolaryngology) (Completed)    FL Video Swallow With Speech    Choking, initial encounter        Relevant Orders    Ambulatory Referral to ENT (Otolaryngology) (Completed)    FL Video Swallow With Speech    Need for influenza vaccination        Relevant Orders    Fluzone (or Fluarix & Flulaval for VFC) >6 Mos ([id number])          Orders Placed This Encounter   Procedures    FL Video Swallow With Speech    Fluzone (or Fluarix & Flulaval for VFC) >6 Mos ([id number])    Ambulatory Referral to ENT (Otolaryngology)     No orders of the defined types were placed in this encounter.      SUMMARY/DISCUSSION  Avoidance of sugar every 2 hours as bread, pastas, etc.  We will refer first to ENT and swallow study, may need to consider CT or ultrasound imaging or GI referral in the future    Next Appointment with me: Visit date not found    Return for Next scheduled follow up.    VITAL SIGNS     Visit Vitals  /68 (BP Location: Left arm, Patient Position: Sitting, Cuff Size: Adult)   Pulse 96   Temp 98.7 °F (37.1 °C) (Temporal)   Ht 190.5 cm (75\")   Wt 62.2 kg (137 lb 3.2 oz)   SpO2 98%   BMI 17.15 kg/m²     Wt Readings from Last 3 Encounters:   11/08/23 62.2 kg (137 lb 3.2 oz)   11/07/22 63.5 kg (140 lb)   12/28/18 60.8 kg (134 lb)     Body mass index is 17.15 kg/m².    MEDICATIONS AT THE TIME OF OFFICE VISIT     No current outpatient medications on file prior to visit.     No current facility-administered medications on file " prior to visit.      HISTORY OF PRESENT ILLNESS     Patient presents with sensation of choking and dysphagia.  He has had 2 necessary Heimlich maneuver's, at one point he was eating chili with crackers on a different occasion he was eating a small bite of hamburger.  He does not eat quickly.  He was fully chewing his food.  He has no shortness of breath or cough or signs of aspiration pneumonia.  No noted GERD, no increased mucus or sinus problem.  At a younger age he did have a coin stuck in his throat which he had to have removed surgically.    REVIEW OF SYSTEMS     Constitutional neg except per HPI   ENT dysphagia, choking sensation  Resp neg  CV neg     PHYSICAL EXAMINATION     Physical Exam  Neck:      Thyroid: No thyroid mass or thyromegaly.      Trachea: Trachea normal.   Musculoskeletal:      Cervical back: Normal range of motion and neck supple.   Lymphadenopathy:      Cervical:      Right cervical: No superficial, deep or posterior cervical adenopathy.     Left cervical: No superficial, deep or posterior cervical adenopathy.       REVIEWED DATA     Labs:   Lab Results   Component Value Date    GLUCOSE 76 11/07/2022    BUN 7 11/07/2022    CREATININE 0.75 (L) 11/07/2022    EGFRRESULT 128.4 11/07/2022    BCR 9.3 11/07/2022    K 4.0 11/07/2022    CO2 30.3 (H) 11/07/2022    CALCIUM 9.7 11/07/2022    PROTENTOTREF 7.1 11/07/2022    ALBUMIN 5.40 (H) 11/07/2022    BILITOT 0.6 11/07/2022    AST 23 11/07/2022    ALT 24 11/07/2022     Lab Results   Component Value Date    WBC 6.58 11/07/2022    HGB 15.2 11/07/2022    HCT 43.8 11/07/2022    MCV 89.9 11/07/2022     11/07/2022     Lab Results   Component Value Date    TSH 1.950 11/07/2022      Imaging:           Medical Tests:           Summary of old records / correspondence / consultant report:           Request outside records:           *Dragon dictation used for documentation.

## 2023-11-10 ENCOUNTER — OFFICE VISIT (OUTPATIENT)
Dept: INTERNAL MEDICINE | Age: 28
End: 2023-11-10
Payer: COMMERCIAL

## 2023-11-10 VITALS
HEART RATE: 77 BPM | DIASTOLIC BLOOD PRESSURE: 70 MMHG | SYSTOLIC BLOOD PRESSURE: 118 MMHG | TEMPERATURE: 97.3 F | BODY MASS INDEX: 17.28 KG/M2 | OXYGEN SATURATION: 96 % | WEIGHT: 139 LBS | HEIGHT: 75 IN

## 2023-11-10 DIAGNOSIS — Z00.00 ANNUAL PHYSICAL EXAM: Primary | ICD-10-CM

## 2023-11-10 PROCEDURE — 99395 PREV VISIT EST AGE 18-39: CPT

## 2023-11-10 NOTE — PROGRESS NOTES
"    I N T E R N A L  M E D I C I N E  ALFREDO Knight      ENCOUNTER DATE:  11/10/2023    Kenney Farry / 28 y.o. / male    CHIEF COMPLAINT     Annual Exam    Recently seen by ALFREDO Brown on 2023 for choking.  Referred to ENT for choking/ globus sensation.  Order for swallow study placed.   He has not yet scheduled.    Continues to work with [employer] company.     He has plans to update his vision exam with optometry in the near future.        VITALS     Visit Vitals  /70   Pulse 77   Temp 97.3 °F (36.3 °C)   Ht 190.5 cm (75\")   Wt 63 kg (139 lb)   SpO2 96%   BMI 17.37 kg/m²       BP Readings from Last 3 Encounters:   11/10/23 118/70   23 116/68   22 118/76     Wt Readings from Last 3 Encounters:   11/10/23 63 kg (139 lb)   23 62.2 kg (137 lb 3.2 oz)   22 63.5 kg (140 lb)      Body mass index is 17.37 kg/m².      MEDICATIONS     No current outpatient medications on file prior to visit.     No current facility-administered medications on file prior to visit.         HISTORY OF PRESENT ILLNESS      Kenney presents for annual health maintenance visit.    General health: good  Lifestyle:  Attempting to lose weight?: No   Diet: eats moderately healthy  Exercise: walking regularly   Tobacco: Never used   Alcohol: occasional/infrequent  Work: Full-time  Reproductive health:  Sexually active?: No   Concern for STD?: No   Sexual problems?: No problems   Sees Urologist?: No   Depression Screenin/8/2023    10:57 AM   PHQ-2/PHQ-9 Depression Screening   Little Interest or Pleasure in Doing Things 0-->not at all   Feeling Down, Depressed or Hopeless 0-->not at all   PHQ-9: Brief Depression Severity Measure Score 0         PHQ-2: 0 (Not depressed)     PHQ-9: 0 (Negative screening for depression)    Patient Care Team:  Adenike Pollack APRN as PCP - General (Family Medicine)  ______________________________________________________________________    ALLERGIES  Allergies   Allergen " Reactions    Poison Ivy Extract         PFS:     The following portions of the patient's history were reviewed and updated as appropriate: Allergies / Current Medications / Past Medical History / Surgical History / Social History / Family History    PROBLEM LIST   Patient Active Problem List   Diagnosis    Low back pain    Marfanoid habitus    Scoliosis    Chronic seasonal allergic rhinitis due to pollen       PAST MEDICAL HISTORY  Past Medical History:   Diagnosis Date    Allergic        SURGICAL HISTORY  Past Surgical History:   Procedure Laterality Date    HERNIA REPAIR         SOCIAL HISTORY  Social History     Socioeconomic History    Marital status: Single   Tobacco Use    Smoking status: Never    Smokeless tobacco: Never   Vaping Use    Vaping Use: Never used   Substance and Sexual Activity    Alcohol use: No    Drug use: No    Sexual activity: Yes     Partners: Female       FAMILY HISTORY  Family History   Problem Relation Age of Onset    No Known Problems Mother     Diabetes type I Father     Diabetes Father         Had a kidney/pancreas transplant in 2007    Kidney disease Father         Had a kidney/pancreas transplant in 2007    No Known Problems Maternal Grandmother     No Known Problems Paternal Grandmother     COPD Paternal Grandfather     Hypertension Paternal Grandfather     Cancer Maternal Aunt         Pancreatic       IMMUNIZATION HISTORY  Immunization History   Administered Date(s) Administered    DTaP 1997, 1997, 02/21/1998, 02/09/1999, 03/13/2002    FluMist 2-49yrs 10/26/2017    Fluzone (or Fluarix & Flulaval for VFC) >6mos 10/30/2015, 10/03/2020, 11/07/2022, 11/08/2023    Fluzone Quad >6mos (Multi-dose) 10/02/2012, 12/08/2016, 12/08/2016    HPV Quadrivalent 09/07/2013, 01/11/2014    Hep B, Adolescent or Pediatric 1997, 1997, 05/12/1998    Hib (PRP-OMP) 1997, 1997, 02/21/1998, 08/11/1998    IPV 1997, 1997, 08/11/1998, 03/13/2002    Influenza TIV  (IM) 10/15/2008    MMR 08/11/1998, 03/13/2002    Meningococcal Conjugate 11/11/2008, 08/10/2015    Tdap 11/11/2008, 11/07/2022    flucelvax quad pfs =>4 YRS 10/09/2019         REVIEW OF SYSTEMS     Review of Systems   Constitutional:  Negative for chills, fever and unexpected weight change.   Respiratory:  Negative for cough, chest tightness and shortness of breath.    Cardiovascular:  Negative for chest pain, palpitations and leg swelling.   Neurological:  Negative for dizziness, weakness, light-headedness and headaches.   Psychiatric/Behavioral:  The patient is not nervous/anxious.        PHYSICAL EXAMINATION     Physical Exam  Vitals reviewed.   Constitutional:       General: He is not in acute distress.     Appearance: Normal appearance. He is not ill-appearing, toxic-appearing or diaphoretic.   HENT:      Head: Normocephalic and atraumatic.      Right Ear: Tympanic membrane, ear canal and external ear normal. There is no impacted cerumen.      Left Ear: Tympanic membrane, ear canal and external ear normal. There is no impacted cerumen.      Nose: Nose normal. No congestion or rhinorrhea.      Mouth/Throat:      Mouth: Mucous membranes are moist.      Pharynx: Oropharynx is clear. No oropharyngeal exudate or posterior oropharyngeal erythema.   Eyes:      Extraocular Movements: Extraocular movements intact.      Conjunctiva/sclera: Conjunctivae normal.      Pupils: Pupils are equal, round, and reactive to light.   Cardiovascular:      Rate and Rhythm: Normal rate and regular rhythm.      Heart sounds: Normal heart sounds.   Pulmonary:      Effort: Pulmonary effort is normal. No respiratory distress.      Breath sounds: Normal breath sounds.   Abdominal:      General: Bowel sounds are normal.      Palpations: Abdomen is soft.      Tenderness: There is no abdominal tenderness.   Musculoskeletal:         General: Normal range of motion.      Cervical back: Normal range of motion and neck supple.      Right lower  "leg: No edema.      Left lower leg: No edema.   Lymphadenopathy:      Cervical: No cervical adenopathy.   Skin:     General: Skin is warm and dry.   Neurological:      General: No focal deficit present.      Mental Status: He is alert and oriented to person, place, and time. Mental status is at baseline.   Psychiatric:         Mood and Affect: Mood normal.         Behavior: Behavior normal.         Thought Content: Thought content normal.         Judgment: Judgment normal.         REVIEWED DATA      Labs:    Lab Results   Component Value Date     11/07/2022    K 4.0 11/07/2022    CALCIUM 9.7 11/07/2022    AST 23 11/07/2022    ALT 24 11/07/2022    BUN 7 11/07/2022    CREATININE 0.75 (L) 11/07/2022    CREATININE 0.84 12/28/2018    EGFRIFNONA 115 12/28/2018    EGFRIFAFRI 140 12/28/2018       Lab Results   Component Value Date    GLUCOSE 76 11/07/2022    HGBA1C 5.20 11/07/2022    TSH 1.950 11/07/2022    FREET4 1.11 11/07/2022       No results found for: \"PSA\"    [unfilled]    Lab Results   Component Value Date    LDL 32 11/07/2022    HDL 78 (H) 11/07/2022    TRIG 37 11/07/2022    CHOLHDLRATIO 1.54 11/07/2022       No components found for: \"FSRE336K\"    Lab Results   Component Value Date    WBC 6.58 11/07/2022    HGB 15.2 11/07/2022    MCV 89.9 11/07/2022     11/07/2022       Lab Results   Component Value Date    PROTEIN Negative 11/07/2022    GLUCOSEU Negative 11/07/2022    BLOODU Negative 11/07/2022    NITRITEU Negative 11/07/2022    LEUKOCYTESUR Negative 11/07/2022          Lab Results   Component Value Date    HEPCVIRUSABY <0.1 11/07/2022       Imaging:           Medical Tests:           ASSESSMENT & PLAN     ANNUAL WELLNESS EXAM / PHYSICAL     Diagnoses and all orders for this visit:    1. Annual physical exam (Primary)  -     CBC & Differential  -     Comprehensive Metabolic Panel  -     Hemoglobin A1c  -     Lipid Panel With / Chol / HDL Ratio  -     TSH+Free T4  -     Urinalysis With Microscopic If " Indicated (No Culture) - Urine, Clean Catch         Summary/Discussion:     Encouraged patient to ensure 3 high protein/ calorie meals daily.      BMI is below normal parameters (malnutrition). Recommendations: treating the underlying disease process - referred to ENT for investigation of choking episodes    Next Appointment with me: Visit date not found    Return in about 1 year (around 11/10/2024) for Annual physical.      HEALTHCARE MAINTENANCE ISSUES       Cancer Screening:  Colon: Initial/Next screening at age: 45  Repeat colon cancer screening: N/A at this time  Prostate: Start screening at 50 then annually  Testicular: Recommended monthly self exam  Skin: Monthly self skin examination, annual exam by health professional  Lung: Does not meet criteria for lung cancer screening.   Other:    Screening Labs & Tests:  Lab results reviewed & discussed with with patient or orders placed today.  EKG:  CV Screening: Lipid panel  DEXA (75+ or risk factors):   HEP C (If born [birth year] or risk factors): Previously had negative screen  Other:     Immunization/Vaccinations (to be given today unless deferred by patient)  Influenza: Patient had the flu shot this season  Hepatitis A: Verify immunization records  Hepatitis B: Verify immunization records  Tetanus/Pertussis: Up to date  Pneumovax/PCV: Not needed at this time  Shingles: Not needed at this time  COVID: Does not plan to get the latest booster  Lifestyle Counseling:  Lifestyle Modifications: Continue good lifestyle choices/modifications and Reduce exposure to stress if possible  Safety Issues: Always wear seatbelt, Avoid texting while driving   Use sunscreen, regular skin examination  Recommended annual dental/vision examination.  Emotional/Stress/Sleep: Reviewed and  given when appropriate      Health Maintenance   Topic Date Due    BMI FOLLOWUP  Never done    COVID-19 Vaccine (1) 11/12/2023 (Originally 2/8/1998)    ANNUAL PHYSICAL  11/10/2024    TDAP/TD  VACCINES (3 - Td or Tdap) 11/07/2032    HEPATITIS C SCREENING  Completed    INFLUENZA VACCINE  Completed    Pneumococcal Vaccine 0-64  Aged Out

## 2023-11-11 LAB
ALBUMIN SERPL-MCNC: 5.3 G/DL (ref 3.5–5.2)
ALBUMIN/GLOB SERPL: 2.7 G/DL
ALP SERPL-CCNC: 77 U/L (ref 39–117)
ALT SERPL-CCNC: 19 U/L (ref 1–41)
APPEARANCE UR: CLEAR
AST SERPL-CCNC: 23 U/L (ref 1–40)
BASOPHILS # BLD AUTO: 0.09 10*3/MM3 (ref 0–0.2)
BASOPHILS NFR BLD AUTO: 1.4 % (ref 0–1.5)
BILIRUB SERPL-MCNC: 0.8 MG/DL (ref 0–1.2)
BILIRUB UR QL STRIP: NEGATIVE
BUN SERPL-MCNC: 8 MG/DL (ref 6–20)
BUN/CREAT SERPL: 9.3 (ref 7–25)
CALCIUM SERPL-MCNC: 9.9 MG/DL (ref 8.6–10.5)
CHLORIDE SERPL-SCNC: 101 MMOL/L (ref 98–107)
CHOLEST SERPL-MCNC: 114 MG/DL (ref 0–200)
CHOLEST/HDLC SERPL: 1.63 {RATIO}
CO2 SERPL-SCNC: 30.7 MMOL/L (ref 22–29)
COLOR UR: YELLOW
CREAT SERPL-MCNC: 0.86 MG/DL (ref 0.76–1.27)
EGFRCR SERPLBLD CKD-EPI 2021: 122.5 ML/MIN/1.73
EOSINOPHIL # BLD AUTO: 0.61 10*3/MM3 (ref 0–0.4)
EOSINOPHIL NFR BLD AUTO: 9.2 % (ref 0.3–6.2)
ERYTHROCYTE [DISTWIDTH] IN BLOOD BY AUTOMATED COUNT: 12.7 % (ref 12.3–15.4)
GLOBULIN SER CALC-MCNC: 2 GM/DL
GLUCOSE SERPL-MCNC: 85 MG/DL (ref 65–99)
GLUCOSE UR QL STRIP: NEGATIVE
HBA1C MFR BLD: 4.9 % (ref 4.8–5.6)
HCT VFR BLD AUTO: 43.1 % (ref 37.5–51)
HDLC SERPL-MCNC: 70 MG/DL (ref 40–60)
HGB BLD-MCNC: 14.9 G/DL (ref 13–17.7)
HGB UR QL STRIP: NEGATIVE
IMM GRANULOCYTES # BLD AUTO: 0.02 10*3/MM3 (ref 0–0.05)
IMM GRANULOCYTES NFR BLD AUTO: 0.3 % (ref 0–0.5)
KETONES UR QL STRIP: NEGATIVE
LDLC SERPL CALC-MCNC: 34 MG/DL (ref 0–100)
LEUKOCYTE ESTERASE UR QL STRIP: NEGATIVE
LYMPHOCYTES # BLD AUTO: 1.88 10*3/MM3 (ref 0.7–3.1)
LYMPHOCYTES NFR BLD AUTO: 28.4 % (ref 19.6–45.3)
MCH RBC QN AUTO: 31.2 PG (ref 26.6–33)
MCHC RBC AUTO-ENTMCNC: 34.6 G/DL (ref 31.5–35.7)
MCV RBC AUTO: 90.4 FL (ref 79–97)
MONOCYTES # BLD AUTO: 0.53 10*3/MM3 (ref 0.1–0.9)
MONOCYTES NFR BLD AUTO: 8 % (ref 5–12)
NEUTROPHILS # BLD AUTO: 3.48 10*3/MM3 (ref 1.7–7)
NEUTROPHILS NFR BLD AUTO: 52.7 % (ref 42.7–76)
NITRITE UR QL STRIP: NEGATIVE
NRBC BLD AUTO-RTO: 0 /100 WBC (ref 0–0.2)
PH UR STRIP: 7 [PH] (ref 5–8)
PLATELET # BLD AUTO: 197 10*3/MM3 (ref 140–450)
POTASSIUM SERPL-SCNC: 4 MMOL/L (ref 3.5–5.2)
PROT SERPL-MCNC: 7.3 G/DL (ref 6–8.5)
PROT UR QL STRIP: NEGATIVE
RBC # BLD AUTO: 4.77 10*6/MM3 (ref 4.14–5.8)
SODIUM SERPL-SCNC: 140 MMOL/L (ref 136–145)
SP GR UR STRIP: 1.02 (ref 1–1.03)
T4 FREE SERPL-MCNC: 1.27 NG/DL (ref 0.93–1.7)
TRIGL SERPL-MCNC: 38 MG/DL (ref 0–150)
TSH SERPL DL<=0.005 MIU/L-ACNC: 1.4 UIU/ML (ref 0.27–4.2)
UROBILINOGEN UR STRIP-MCNC: NORMAL MG/DL
VLDLC SERPL CALC-MCNC: 10 MG/DL (ref 5–40)
WBC # BLD AUTO: 6.61 10*3/MM3 (ref 3.4–10.8)

## 2023-11-30 ENCOUNTER — HOSPITAL ENCOUNTER (OUTPATIENT)
Dept: GENERAL RADIOLOGY | Facility: HOSPITAL | Age: 28
Discharge: HOME | End: 2023-11-30
Payer: COMMERCIAL

## 2023-11-30 DIAGNOSIS — R09.A2 GLOBUS SENSATION: ICD-10-CM

## 2023-11-30 DIAGNOSIS — T17.308A CHOKING, INITIAL ENCOUNTER: ICD-10-CM

## 2023-11-30 PROCEDURE — 74230 X-RAY XM SWLNG FUNCJ C+: CPT

## 2023-11-30 PROCEDURE — 92611 MOTION FLUOROSCOPY/SWALLOW: CPT

## 2023-11-30 RX ADMIN — BARIUM SULFATE 55 ML: 0.81 POWDER, FOR SUSPENSION ORAL at 14:20

## 2023-11-30 RX ADMIN — BARIUM SULFATE 1 TEASPOON(S): 0.6 CREAM ORAL at 14:20

## 2023-11-30 RX ADMIN — BARIUM SULFATE 50 ML: 400 SUSPENSION ORAL at 14:20

## 2023-11-30 RX ADMIN — BARIUM SULFATE 4 ML: 980 POWDER, FOR SUSPENSION ORAL at 14:20

## 2023-11-30 NOTE — MBS/VFSS/FEES
Outpatient Speech Language Pathology   Adult Swallow Initial Evaluation  [facility]     Patient Name: Kenney Balderrama  : 1995  MRN: [id number]  Today's Date: 2023         Visit Date: 2023   Patient Active Problem List   Diagnosis    Low back pain    Marfanoid habitus    Scoliosis    Chronic seasonal allergic rhinitis due to pollen        Past Medical History:   Diagnosis Date    Allergic         Past Surgical History:   Procedure Laterality Date    HERNIA REPAIR           Visit Dx:     ICD-10-CM ICD-9-CM   1. Globus sensation  R09.A2 784.99   2. Choking, initial encounter  T17.308A 933.1            OP SLP Assessment/Plan - 23 1459          SLP Assessment    Functional Problems Swallowing  -    Patient would benefit from skilled therapy intervention No  -              User Key  (r) = Recorded By, (t) = Taken By, (c) = Cosigned By      Initials Name Provider Type     Angeles Malik MS CCC-SLP Speech and Language Pathologist                     SLP Adult Swallow Evaluation       Row Name 23 1400       Rehab Evaluation    Document Type evaluation  -    Patient Effort excellent  -       General Information    Patient Profile Reviewed yes  -    Pertinent History Of Current Problem Pt required Heimlich twice recently, once with chili and crackers on a different occasion he was eating a small bite of hamburger. Pt reported this occured at the beginning of the meal. Pt continued to feel a globus sensation following events.  -    Current Method of Nutrition regular textures;thin liquids  -    Precautions/Limitations, Hearing WFL  -    Prior Level of Function-Communication WFL  -    Prior Level of Function-Swallowing no diet consistency restrictions  -    Plans/Goals Discussed with patient;family;agreed upon  -    Barriers to Rehab none identified  -    Patient's Goals for Discharge eat/drink without coughing/choking  -    Family Goals for Discharge patient able to  eat/drink without coughing/choking  [facility]       Pain    Additional Documentation Pain Scale: FACES Pre/Post-Treatment (Group)  [facility]       Pain Scale: FACES Pre/Post-Treatment    Pain: FACES Scale, Pretreatment 0-->no hurt  -       Oral Musculature and Cranial Nerve Assessment    Oral Motor General Assessment [facility]  -       MBS/VFSS Interpretation    VFSS Summary Patient presents with a functional oropharyngeal swallow. Question slight irregularity of anterior esophageal lumen below cricopharyngeus.Normal swallow initiation with nectar and thins via cup/straw. No penetration demonstrated. Muniz AP transit with puree. Pt cleared trace oropharyngeal residue with spontaneous second swallow. Rotary mastication with soft solids and regular.  Mild upper cervical esophageal retention with solids consistently demonstrated, despite adequate upper esophageal sphincter opening. Pt able to clear with multiple swallows. Esophageal scan revealed retrograde movement. SLP recs referral to ENT/GI.  -       SLP Communication to Radiology    Summary Statement Radiologist present: Dr. Solorzano. Patient presents with a functional oropharyngeal swallow. Esophageal scan revealed retrograde movement. Mild upper cervical esophageal retention with solids consistently demonstrated, despite adequate upper esophageal sphincter opening. Pt able to clear with multiple swallows. No aspiration demonstrated.  -       SLP Evaluation Clinical Impression    SLP Swallowing Diagnosis swallow WFL/no suspected pharyngeal impairment  -       Recommendations    SLP Diet Recommendation regular textures;thin liquids  -    Recommended Diagnostics No further SLP services recommended  -    Recommended Precautions and Strategies upright posture during/after eating;small bites of food and sips of liquid;alternate between small bites of food and sips of liquid;other (see comments)  Take a drink before beginning a meal.  -    Oral Care Recommendations Oral  Care BID/PRN  -    SLP Rec. for Method of Medication Administration meds whole;as tolerated  -    Demonstrates Need for Referral to Another Service otolaryngology (ENT);gastroenterology  -              User Key  (r) = Recorded By, (t) = Taken By, (c) = Cosigned By      Initials Name Provider Type     Angeles Malik MS CCC-SLP Speech and Language Pathologist                                   OP SLP Education       Row Name 11/30/23 1500       Education    Barriers to Learning No barriers identified  -    Action Taken to Address Barriers Education to mother present  -    Assessed Learning needs  -    Learning Motivation Strong  -    Learning Method Explanation;Other (comment)  Video  -              User Key  (r) = Recorded By, (t) = Taken By, (c) = Cosigned By      Initials Name Effective Dates     Angeles Malik MS CCC-SLP 06/16/21 -                              Time Calculation:   SLP Start Time: 1345  Untimed Charges  55913-[employer] Fluoro Eval Swallow Minutes: 75  Total Minutes  Untimed Charges Total Minutes: 75   Total Minutes: 75    Therapy Charges for Today       Code Description Service Date Service Provider Modifiers Qty    [id number] HC ST MOTION FLUORO EVAL SWALLOW 5 11/30/2023 Angeles Malik MS CCC-SLP GN 1                     Angeles Malik MS CCC-SLP  11/30/2023

## 2024-11-10 NOTE — PROGRESS NOTES
"    I N T E R N A L  M E D I C I N E  Adenike Ranjeet, APRN      ENCOUNTER DATE:  2024    Kenney Tacos / 29 y.o. / male    CHIEF COMPLAINT     Annual Exam    Last seen 2023.  Continues to work with [employer] company.  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      Completed 2023 Swallow study for investigation of globus sensation, and referred to ENT.  He saw ENT without any abnormal findings.  Symptoms have completely resolved after sitting up straight while eating.  Denies any dysphagia.        BMI 18: Weight is up 5 pounds since 2023.  Eating well.  Stays physically active with work.  His BMI has always been on lower side.      VITALS     Visit Vitals  /74   Pulse 90   Temp 98.7 °F (37.1 °C)   Resp 18   Ht 190.5 cm (75\")   Wt 65.3 kg (144 lb)   SpO2 99%   BMI 18.00 kg/m²       BP Readings from Last 3 Encounters:   24 122/74   11/10/23 118/70   23 116/68     Wt Readings from Last 3 Encounters:   24 65.3 kg (144 lb)   11/10/23 63 kg (139 lb)   23 62.2 kg (137 lb 3.2 oz)      Body mass index is 18 kg/m².      MEDICATIONS     No current outpatient medications on file prior to visit.     No current facility-administered medications on file prior to visit.         HISTORY OF PRESENT ILLNESS      Kenney presents for annual health maintenance visit.    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  Tobacco: Never used   Alcohol: occasional/infrequent  Work: Full-time  Reproductive health:  Sexually active?: Yes   Concern for STD?: No   Sexual problems?: No problems   Sees Urologist?: No   Depression Screenin/11/2024     1:34 PM   PHQ-2/PHQ-9 Depression Screening   Little interest or pleasure in doing things Not at all   Feeling down, depressed, or hopeless Not at all         PHQ-2: 0 (Not " depressed)     PHQ-9: 0 (Negative screening for depression)    Patient Care Team:  Adenike Pollack APRN as PCP - General (Family Medicine)  ______________________________________________________________________    ALLERGIES  Allergies   Allergen Reactions    Poison Ivy Extract         PFS:     The following portions of the patient's history were reviewed and updated as appropriate: Allergies / Current Medications / Past Medical History / Surgical History / Social History / Family History    PROBLEM LIST   Patient Active Problem List   Diagnosis    Low back pain    Marfanoid habitus    Scoliosis    Chronic seasonal allergic rhinitis due to pollen       PAST MEDICAL HISTORY  Past Medical History:   Diagnosis Date    Allergic        SURGICAL HISTORY  Past Surgical History:   Procedure Laterality Date    HERNIA REPAIR         SOCIAL HISTORY  Social History     Socioeconomic History    Marital status: Single   Tobacco Use    Smoking status: Never     Passive exposure: Never    Smokeless tobacco: Never   Vaping Use    Vaping status: Never Used   Substance and Sexual Activity    Alcohol use: No    Drug use: No    Sexual activity: Yes     Partners: Female       FAMILY HISTORY  Family History   Problem Relation Age of Onset    No Known Problems Mother     Diabetes type I Father     Diabetes Father         Had a kidney/pancreas transplant in 2007    Kidney disease Father         Had a kidney/pancreas transplant in 2007    No Known Problems Maternal Grandmother     No Known Problems Paternal Grandmother     COPD Paternal Grandfather     Hypertension Paternal Grandfather     Cancer Maternal Aunt         Pancreatic       IMMUNIZATION HISTORY  Immunization History   Administered Date(s) Administered    DTaP 1997, 1997, 02/21/1998, 02/09/1999, 03/13/2002    FluMist 2-49yrs 10/26/2017    Fluzone  >6mos 11/11/2024    Fluzone (or Fluarix & Flulaval for VFC) >6mos 10/30/2015, 10/03/2020, 11/07/2022, 11/08/2023    Fluzone  Quad >6mos (Multi-dose) 10/02/2012, 12/08/2016, 12/08/2016    HPV Quadrivalent 09/07/2013, 01/11/2014    Hep B, Adolescent or Pediatric 1997, 1997, 05/12/1998    Hib (PRP-OMP) 1997, 1997, 02/21/1998, 08/11/1998    IPV 1995, 1997, 08/11/1998, 03/13/2002    Influenza TIV (IM) 10/15/2008    MMR 08/11/1998, 03/13/2002    Meningococcal Conjugate 11/11/2008, 08/10/2015    Tdap 11/11/2008, 11/07/2022    flucelvax quad pfs =>4 YRS 10/09/2019         REVIEW OF SYSTEMS     Review of Systems   Constitutional:  Negative for chills, fever and unexpected weight change.   Respiratory:  Negative for cough, chest tightness and shortness of breath.    Cardiovascular:  Negative for chest pain, palpitations and leg swelling.   Neurological:  Negative for dizziness, weakness, light-headedness and headaches.   Psychiatric/Behavioral:  The patient is not nervous/anxious.        PHYSICAL EXAMINATION     Physical Exam  Vitals reviewed.   Constitutional:       General: He is not in acute distress.     Appearance: Normal appearance. He is not ill-appearing, toxic-appearing or diaphoretic.   HENT:      Head: Normocephalic and atraumatic.      Right Ear: Tympanic membrane, ear canal and external ear normal. There is no impacted cerumen.      Left Ear: Tympanic membrane, ear canal and external ear normal. There is no impacted cerumen.      Nose: Nose normal. No congestion or rhinorrhea.      Mouth/Throat:      Mouth: Mucous membranes are moist.      Pharynx: Oropharynx is clear. No oropharyngeal exudate or posterior oropharyngeal erythema.   Eyes:      Extraocular Movements: Extraocular movements intact.      Conjunctiva/sclera: Conjunctivae normal.      Pupils: Pupils are equal, round, and reactive to light.   Cardiovascular:      Rate and Rhythm: Normal rate and regular rhythm.      Heart sounds: Normal heart sounds.   Pulmonary:      Effort: Pulmonary effort is normal. No respiratory distress.      Breath  "sounds: Normal breath sounds.   Abdominal:      General: Bowel sounds are normal.      Palpations: Abdomen is soft.      Tenderness: There is no abdominal tenderness.   Musculoskeletal:         General: Normal range of motion.      Cervical back: Normal range of motion and neck supple.      Right lower leg: No edema.      Left lower leg: No edema.   Lymphadenopathy:      Cervical: No cervical adenopathy.   Skin:     General: Skin is warm and dry.   Neurological:      General: No focal deficit present.      Mental Status: He is alert and oriented to person, place, and time. Mental status is at baseline.   Psychiatric:         Mood and Affect: Mood normal.         Behavior: Behavior normal.         Thought Content: Thought content normal.         Judgment: Judgment normal.         REVIEWED DATA      Labs:    Lab Results   Component Value Date     11/10/2023    K 4.0 11/10/2023    CALCIUM 9.9 11/10/2023    AST 23 11/10/2023    ALT 19 11/10/2023    BUN 8 11/10/2023    CREATININE 0.86 11/10/2023    CREATININE 0.75 (L) 11/07/2022    CREATININE 0.84 12/28/2018    EGFRIFNONA 115 12/28/2018    EGFRIFAFRI 140 12/28/2018       Lab Results   Component Value Date    GLUCOSE 85 11/10/2023    HGBA1C 4.90 11/10/2023    HGBA1C 5.20 11/07/2022    TSH 1.400 11/10/2023    FREET4 1.27 11/10/2023       No results found for: \"PSA\"    [unfilled]    Lab Results   Component Value Date    LDL 34 11/10/2023    HDL 70 (H) 11/10/2023    TRIG 38 11/10/2023    CHOLHDLRATIO 1.63 11/10/2023       No components found for: \"HAQU477K\"    Lab Results   Component Value Date    WBC 6.61 11/10/2023    HGB 14.9 11/10/2023    MCV 90.4 11/10/2023     11/10/2023       Lab Results   Component Value Date    PROTEIN Negative 11/10/2023    GLUCOSEU Negative 11/10/2023    BLOODU Negative 11/10/2023    NITRITEU Negative 11/10/2023    LEUKOCYTESUR Negative 11/10/2023          Lab Results   Component Value Date    HEPCVIRUSABY <0.1 11/07/2022       Imaging: "           Medical Tests:           ASSESSMENT & PLAN     ANNUAL WELLNESS EXAM / PHYSICAL     Diagnoses and all orders for this visit:    1. Annual physical exam (Primary)  -     CBC & Differential  -     Comprehensive Metabolic Panel  -     Hemoglobin A1c  -     Lipid Panel With / Chol / HDL Ratio  -     TSH+Free T4  -     Urinalysis With Microscopic If Indicated (No Culture) - Urine, Clean Catch    2. Need for vaccination  -     Fluzone >6mos ([id number])         Summary/Discussion:     Encouraged patient to incorporate high calorie/ high protein healthy snacks.  Consider hard boiled eggs, lean chicken, nuts, Boast/ Ensure protein shakes.      Next Appointment with me: Visit date not found    Return in about 1 year (around 11/11/2025) for Annual physical.      HEALTHCARE MAINTENANCE ISSUES       Cancer Screening:  Colon: Initial/Next screening at age: 45  Repeat colon cancer screening: N/A at this time  Prostate: No screening needed at this time  Testicular: Recommended monthly self exam  Skin: Monthly self skin examination, annual exam by health professional  Lung: Does not meet criteria for lung cancer screening.   Other:    Screening Labs & Tests:  Lab results reviewed & discussed with with patient or orders placed today.  EKG:  CV Screening: Lipid panel  DEXA (75+ or risk factors):   HEP C (If born 0400-[birth year]98 or risk factors): Previously had negative screen  Other:     Immunization/Vaccinations (to be given today unless deferred by patient)  Influenza: Give flu shot today  Hepatitis A: Verify immunization records  Hepatitis B: Verify immunization records  Tetanus/Pertussis: Up to date  Pneumovax/PCV: Not needed at this time  Shingles: Not needed at this time  COVID: Does not plan to get the latest booster  Lifestyle Counseling:  Lifestyle Modifications: Continue good lifestyle choices/modifications and Reduce exposure to stress if possible  Safety Issues: Always wear seatbelt, Avoid texting while driving   Use  sunscreen, regular skin examination  Recommended annual dental/vision examination.  Emotional/Stress/Sleep: Reviewed and  given when appropriate      Health Maintenance   Topic Date Due    BMI FOLLOWUP  11/10/2024    COVID-19 Vaccine (1 - 2024-25 season) 11/13/2024 (Originally 9/1/2024)    ANNUAL PHYSICAL  11/11/2025    TDAP/TD VACCINES (3 - Td or Tdap) 11/07/2032    HEPATITIS C SCREENING  Completed    INFLUENZA VACCINE  Completed    Pneumococcal Vaccine 0-64  Aged Out

## 2024-11-11 ENCOUNTER — OFFICE VISIT (OUTPATIENT)
Dept: INTERNAL MEDICINE | Age: 29
End: 2024-11-11
Payer: COMMERCIAL

## 2024-11-11 VITALS
TEMPERATURE: 98.7 F | RESPIRATION RATE: 18 BRPM | WEIGHT: 144 LBS | DIASTOLIC BLOOD PRESSURE: 74 MMHG | HEART RATE: 90 BPM | BODY MASS INDEX: 17.91 KG/M2 | SYSTOLIC BLOOD PRESSURE: 122 MMHG | HEIGHT: 75 IN | OXYGEN SATURATION: 99 %

## 2024-11-11 DIAGNOSIS — Z00.00 ANNUAL PHYSICAL EXAM: Primary | ICD-10-CM

## 2024-11-11 DIAGNOSIS — Z23 NEED FOR VACCINATION: ICD-10-CM

## 2024-11-11 LAB
ALBUMIN SERPL-MCNC: 4.7 G/DL (ref 3.5–5.2)
ALBUMIN/GLOB SERPL: 2.1 G/DL
ALP SERPL-CCNC: 74 U/L (ref 39–117)
ALT SERPL-CCNC: 18 U/L (ref 1–41)
APPEARANCE UR: ABNORMAL
AST SERPL-CCNC: 21 U/L (ref 1–40)
BASOPHILS # BLD AUTO: 0.09 10*3/MM3 (ref 0–0.2)
BASOPHILS NFR BLD AUTO: 1.4 % (ref 0–1.5)
BILIRUB SERPL-MCNC: 0.5 MG/DL (ref 0–1.2)
BILIRUB UR QL STRIP: NEGATIVE
BUN SERPL-MCNC: 9 MG/DL (ref 6–20)
BUN/CREAT SERPL: 10.8 (ref 7–25)
CALCIUM SERPL-MCNC: 9.5 MG/DL (ref 8.6–10.5)
CHLORIDE SERPL-SCNC: 103 MMOL/L (ref 98–107)
CHOLEST SERPL-MCNC: 111 MG/DL (ref 0–200)
CHOLEST/HDLC SERPL: 1.56 {RATIO}
CO2 SERPL-SCNC: 30.2 MMOL/L (ref 22–29)
COLOR UR: YELLOW
CREAT SERPL-MCNC: 0.83 MG/DL (ref 0.76–1.27)
EGFRCR SERPLBLD CKD-EPI 2021: 123 ML/MIN/1.73
EOSINOPHIL # BLD AUTO: 0.55 10*3/MM3 (ref 0–0.4)
EOSINOPHIL NFR BLD AUTO: 8.7 % (ref 0.3–6.2)
ERYTHROCYTE [DISTWIDTH] IN BLOOD BY AUTOMATED COUNT: 12.4 % (ref 12.3–15.4)
GLOBULIN SER CALC-MCNC: 2.2 GM/DL
GLUCOSE SERPL-MCNC: 77 MG/DL (ref 65–99)
GLUCOSE UR QL STRIP: NEGATIVE
HBA1C MFR BLD: 5.1 % (ref 4.8–5.6)
HCT VFR BLD AUTO: 43.3 % (ref 37.5–51)
HDLC SERPL-MCNC: 71 MG/DL (ref 40–60)
HGB BLD-MCNC: 14.8 G/DL (ref 13–17.7)
HGB UR QL STRIP: NEGATIVE
IMM GRANULOCYTES # BLD AUTO: 0.01 10*3/MM3 (ref 0–0.05)
IMM GRANULOCYTES NFR BLD AUTO: 0.2 % (ref 0–0.5)
KETONES UR QL STRIP: NEGATIVE
LDLC SERPL CALC-MCNC: 27 MG/DL (ref 0–100)
LEUKOCYTE ESTERASE UR QL STRIP: NEGATIVE
LYMPHOCYTES # BLD AUTO: 2.24 10*3/MM3 (ref 0.7–3.1)
LYMPHOCYTES NFR BLD AUTO: 35.5 % (ref 19.6–45.3)
MCH RBC QN AUTO: 30.5 PG (ref 26.6–33)
MCHC RBC AUTO-ENTMCNC: 34.2 G/DL (ref 31.5–35.7)
MCV RBC AUTO: 89.1 FL (ref 79–97)
MONOCYTES # BLD AUTO: 0.43 10*3/MM3 (ref 0.1–0.9)
MONOCYTES NFR BLD AUTO: 6.8 % (ref 5–12)
NEUTROPHILS # BLD AUTO: 2.99 10*3/MM3 (ref 1.7–7)
NEUTROPHILS NFR BLD AUTO: 47.4 % (ref 42.7–76)
NITRITE UR QL STRIP: NEGATIVE
NRBC BLD AUTO-RTO: 0 /100 WBC (ref 0–0.2)
PH UR STRIP: 7.5 [PH] (ref 5–8)
PLATELET # BLD AUTO: 204 10*3/MM3 (ref 140–450)
POTASSIUM SERPL-SCNC: 4.3 MMOL/L (ref 3.5–5.2)
PROT SERPL-MCNC: 6.9 G/DL (ref 6–8.5)
PROT UR QL STRIP: NEGATIVE
RBC # BLD AUTO: 4.86 10*6/MM3 (ref 4.14–5.8)
SODIUM SERPL-SCNC: 140 MMOL/L (ref 136–145)
SP GR UR STRIP: 1.01 (ref 1–1.03)
T4 FREE SERPL-MCNC: 1.19 NG/DL (ref 0.92–1.68)
TRIGL SERPL-MCNC: 59 MG/DL (ref 0–150)
TSH SERPL DL<=0.005 MIU/L-ACNC: 1.18 UIU/ML (ref 0.27–4.2)
UROBILINOGEN UR STRIP-MCNC: ABNORMAL MG/DL
VLDLC SERPL CALC-MCNC: 13 MG/DL (ref 5–40)
WBC # BLD AUTO: 6.31 10*3/MM3 (ref 3.4–10.8)

## 2024-11-11 PROCEDURE — 90656 IIV3 VACC NO PRSV 0.5 ML IM: CPT

## 2024-11-11 PROCEDURE — 99395 PREV VISIT EST AGE 18-39: CPT

## 2024-11-11 PROCEDURE — 90471 IMMUNIZATION ADMIN: CPT

## 2024-11-11 NOTE — LETTER
[facility]  Vaccine Consent Form    Patient Name:  Kenney Balderrama  Patient :  1995     Vaccine(s) Ordered    Fluzone >6mos ([id number])        Screening Checklist  The following questions should be completed prior to vaccination. If you answer “yes” to any question, it does not necessarily mean you should not be vaccinated. It just means we may need to clarify or ask more questions. If a question is unclear, please ask your healthcare provider to explain it.    Yes No   Any fever or moderate to severe illness today (mild illness and/or antibiotic treatment are not contraindications)?     Do you have a history of a serious reaction to any previous vaccinations, such as anaphylaxis, encephalopathy within 7 days, Guillain-[place] syndrome within 6 weeks, seizure?     Have you received any live vaccine(s) (e.g MMR, TAN) or any other vaccines in the last month (to ensure duplicate doses aren't given)?     Do you have an anaphylactic allergy to latex (DTaP, DTaP-IPV, Hep A, Hep B, MenB, RV, Td, Tdap), baker’s yeast (Hep B, HPV), polysorbates (RSV, nirsevimab, PCV 20, Rotavirrus, Tdap, Shingrix), or gelatin (TAN, MMR)?     Do you have an anaphylactic allergy to neomycin (Rabies, TAN, MMR, IPV, Hep A), polymyxin B (IPV), or streptomycin (IPV)?      Any cancer, leukemia, AIDS, or other immune system disorder? (TAN, MMR, RV)     Do you have a parent, brother, or sister with an immune system problem (if immune competence of vaccine recipient clinically verified, can proceed)? (MMR, TAN)     Any recent steroid treatments for >2 weeks, chemotherapy, or radiation treatment? (TAN, MMR)     Have you received antibody-containing blood transfusions or IVIG in the past 11 months (recommended interval is dependent on product)? (MMR, TAN)     Have you taken antiviral drugs (acyclovir, famciclovir, valacyclovir for TAN) in the last 24 or 48 hours, respectively?      Are you pregnant or planning to become pregnant within 1 month?  "(TAN, MMR, HPV, IPV, MenB, Abrexvy; For Hep B- refer to Engerix-B; For RSV - Abrysvo is indicated for 32-36 weeks of pregnancy from September to January)     For infants, have you ever been told your child has had intussusception or a medical emergency involving obstruction of the intestine (Rotavirus)? If not for an infant, can skip this question.         *Ordering Physicians/APC should be consulted if \"yes\" is checked by the patient or guardian above.  I have received, read, and understand the Vaccine Information Statement (VIS) for each vaccine ordered.  I have considered my or my child's health status as well as the health status of my close contacts.  I have taken the opportunity to discuss my vaccine questions with my or my child's health care provider.   I have requested that the ordered vaccine(s) be given to me or my child.  I understand the benefits and risks of the vaccines.  I understand that I should remain in the clinic for 15 minutes after receiving the vaccine(s).  _________________________________________________________  Signature of Patient or Parent/Legal Guardian ____________________  Date     "

## 2025-03-25 ENCOUNTER — OFFICE VISIT (OUTPATIENT)
Dept: INTERNAL MEDICINE | Age: 30
End: 2025-03-25
Payer: COMMERCIAL

## 2025-03-25 VITALS
SYSTOLIC BLOOD PRESSURE: 104 MMHG | OXYGEN SATURATION: 98 % | HEIGHT: 75 IN | HEART RATE: 97 BPM | TEMPERATURE: 97.6 F | WEIGHT: 148 LBS | BODY MASS INDEX: 18.4 KG/M2 | DIASTOLIC BLOOD PRESSURE: 80 MMHG

## 2025-03-25 DIAGNOSIS — Z20.828 EXPOSURE TO INFLUENZA: ICD-10-CM

## 2025-03-25 DIAGNOSIS — J10.1 INFLUENZA B: Primary | ICD-10-CM

## 2025-03-25 RX ORDER — OSELTAMIVIR PHOSPHATE 75 MG/1
75 CAPSULE ORAL 2 TIMES DAILY
Qty: 10 CAPSULE | Refills: 0 | Status: SHIPPED | OUTPATIENT
Start: 2025-03-25 | End: 2025-03-30

## 2025-03-25 NOTE — PROGRESS NOTES
"    I N T E R N A L  M E D I C I N E  Adenike Pollack, APRN    ENCOUNTER DATE:  03/25/2025    Kenney Balderrama / 29 y.o. / male      CHIEF COMPLAINT / REASON FOR OFFICE VISIT     Nasal Congestion (Pt states he has had cough, runny nose, and ksenia has flu B. Pt has had symptoms 2 days ago.)      ASSESSMENT & PLAN     Diagnoses and all orders for this visit:    1. Influenza B (Primary)  -     oseltamivir (Tamiflu) 75 MG capsule; Take 1 capsule by mouth 2 (Two) Times a Day for 5 days.  Dispense: 10 capsule; Refill: 0    2. Exposure to influenza  -     POCT Influenza A/B         SUMMARY/DISCUSSION  Testing negative for Influenza B; however, he is symptomatic with recent exposure to Influenza B.  Discussed risks, benefits, side effects of Tamiflu discussed.  He elects to proceed.  Encouraged good hydration with water, rest.  Add Mucinex DM from OTC.  He will provide me with an update on his condition via [facility] in 1-2 days.  Aware to visit ER for any acutely worsening symptoms.      Next Appointment with me: Visit date not found    Return for Next scheduled follow up.      VITAL SIGNS     Visit Vitals  /80   Pulse 97   Temp 97.6 °F (36.4 °C)   Ht 190.5 cm (75\")   Wt 67.1 kg (148 lb)   SpO2 98%   BMI 18.50 kg/m²             Wt Readings from Last 3 Encounters:   03/25/25 67.1 kg (148 lb)   11/11/24 65.3 kg (144 lb)   11/10/23 63 kg (139 lb)     Body mass index is 18.5 kg/m².        MEDICATIONS AT THE TIME OF OFFICE VISIT     No current outpatient medications on file prior to visit.     No current facility-administered medications on file prior to visit.        HISTORY OF PRESENT ILLNESS     Ksenia tested positive for Influenza B this weekend.  His symptoms started Sunday night with myalgias, followed by temperature of 100.9 this morning, headache, lack of appetitie, rhinorrhea and nasal congestion, mildly productive cough.  Taking acetaminophen OTC.  No chills, chest pain, dyspnea.  Eating, drinking, urinating " well.      Patient Care Team:  Adenike Pollack APRN as PCP - General (Family Medicine)    REVIEW OF SYSTEMS     Review of Systems   Constitutional:  Positive for fatigue and fever. Negative for chills and unexpected weight change.   HENT:  Positive for congestion and rhinorrhea.    Respiratory:  Positive for cough. Negative for chest tightness and shortness of breath.    Cardiovascular:  Negative for chest pain, palpitations and leg swelling.   Musculoskeletal:  Positive for myalgias.   Neurological:  Negative for dizziness, weakness, light-headedness and headaches.   Psychiatric/Behavioral:  The patient is not nervous/anxious.           PHYSICAL EXAMINATION     Physical Exam  Vitals reviewed.   Constitutional:       General: He is not in acute distress.     Appearance: Normal appearance. He is not ill-appearing, toxic-appearing or diaphoretic.   HENT:      Head: Normocephalic and atraumatic.      Right Ear: Tympanic membrane, ear canal and external ear normal. There is no impacted cerumen.      Left Ear: Tympanic membrane, ear canal and external ear normal. There is no impacted cerumen.      Nose: Nose normal. No congestion or rhinorrhea.      Mouth/Throat:      Mouth: Mucous membranes are moist.      Pharynx: Oropharynx is clear. No oropharyngeal exudate or posterior oropharyngeal erythema.   Cardiovascular:      Rate and Rhythm: Normal rate and regular rhythm.      Heart sounds: Normal heart sounds.   Pulmonary:      Effort: Pulmonary effort is normal.      Breath sounds: Normal breath sounds. No wheezing, rhonchi or rales.   Musculoskeletal:      Right lower leg: No edema.      Left lower leg: No edema.   Lymphadenopathy:      Cervical: No cervical adenopathy.   Neurological:      Mental Status: He is alert and oriented to person, place, and time. Mental status is at baseline.   Psychiatric:         Mood and Affect: Mood normal.         Behavior: Behavior normal.         Thought Content: Thought content normal.          Judgment: Judgment normal.           REVIEWED DATA     Labs:           Imaging:            Medical Tests:           Summary of old records / correspondence / consultant report:           Request outside records: